# Patient Record
Sex: FEMALE | Race: WHITE | NOT HISPANIC OR LATINO
[De-identification: names, ages, dates, MRNs, and addresses within clinical notes are randomized per-mention and may not be internally consistent; named-entity substitution may affect disease eponyms.]

---

## 2021-01-27 ENCOUNTER — RESULT REVIEW (OUTPATIENT)
Age: 51
End: 2021-01-27

## 2022-03-10 PROBLEM — Z00.00 ENCOUNTER FOR PREVENTIVE HEALTH EXAMINATION: Status: ACTIVE | Noted: 2022-03-10

## 2022-03-11 ENCOUNTER — APPOINTMENT (OUTPATIENT)
Dept: UROLOGY | Facility: CLINIC | Age: 52
End: 2022-03-11
Payer: COMMERCIAL

## 2022-03-11 VITALS
HEART RATE: 78 BPM | TEMPERATURE: 98.7 F | HEIGHT: 62 IN | SYSTOLIC BLOOD PRESSURE: 156 MMHG | WEIGHT: 156 LBS | DIASTOLIC BLOOD PRESSURE: 81 MMHG | BODY MASS INDEX: 28.71 KG/M2

## 2022-03-11 DIAGNOSIS — R31.29 OTHER MICROSCOPIC HEMATURIA: ICD-10-CM

## 2022-03-11 DIAGNOSIS — Z78.9 OTHER SPECIFIED HEALTH STATUS: ICD-10-CM

## 2022-03-11 PROCEDURE — 81000 URINALYSIS NONAUTO W/SCOPE: CPT

## 2022-03-11 PROCEDURE — 99203 OFFICE O/P NEW LOW 30 MIN: CPT

## 2022-03-11 NOTE — ASSESSMENT
[FreeTextEntry1] : Patient is a 51 year female who presents with gross hematuria.  Hematuria started one month ago.  She has had 2 episodes during that period.  The most recent being 1 week ago.\par no associated pain.  no associated symptoms.  no modifying factors.\par She did note having flu like symptoms 1 week before first episode\par Renal US from this week was normal\par UA dip today was normal\par she quit smoking 25 years ago\par \par A/P\par 1. gross hematuria\par 2. previous smoker\par  urine cytology, CT Urogram, cystoscopy\par

## 2022-03-11 NOTE — PHYSICAL EXAM
[General Appearance - Well Developed] : well developed [Normal Appearance] : normal appearance [General Appearance - In No Acute Distress] : no acute distress [Edema] : no peripheral edema [] : no respiratory distress [Normal Station and Gait] : the gait and station were normal for the patient's age [Skin Color & Pigmentation] : normal skin color and pigmentation [Oriented To Time, Place, And Person] : oriented to person, place, and time

## 2022-03-15 LAB — URINE CYTOLOGY: NORMAL

## 2022-04-04 ENCOUNTER — RESULT REVIEW (OUTPATIENT)
Age: 52
End: 2022-04-04

## 2022-06-10 ENCOUNTER — APPOINTMENT (OUTPATIENT)
Dept: UROLOGY | Facility: CLINIC | Age: 52
End: 2022-06-10
Payer: COMMERCIAL

## 2022-06-10 VITALS
SYSTOLIC BLOOD PRESSURE: 134 MMHG | HEART RATE: 77 BPM | DIASTOLIC BLOOD PRESSURE: 88 MMHG | HEIGHT: 62 IN | BODY MASS INDEX: 28.71 KG/M2 | WEIGHT: 156 LBS

## 2022-06-10 DIAGNOSIS — R31.0 GROSS HEMATURIA: ICD-10-CM

## 2022-06-10 PROCEDURE — 52000 CYSTOURETHROSCOPY: CPT

## 2022-06-16 PROBLEM — R31.0 GROSS HEMATURIA: Status: ACTIVE | Noted: 2022-03-11

## 2023-11-15 ENCOUNTER — TRANSCRIPTION ENCOUNTER (OUTPATIENT)
Age: 53
End: 2023-11-15

## 2024-07-22 ENCOUNTER — TRANSCRIPTION ENCOUNTER (OUTPATIENT)
Age: 54
End: 2024-07-22